# Patient Record
Sex: MALE | Race: WHITE | NOT HISPANIC OR LATINO
[De-identification: names, ages, dates, MRNs, and addresses within clinical notes are randomized per-mention and may not be internally consistent; named-entity substitution may affect disease eponyms.]

---

## 2021-10-15 PROBLEM — Z00.00 ENCOUNTER FOR PREVENTIVE HEALTH EXAMINATION: Status: ACTIVE | Noted: 2021-10-15

## 2021-10-28 ENCOUNTER — APPOINTMENT (OUTPATIENT)
Dept: PULMONOLOGY | Facility: CLINIC | Age: 41
End: 2021-10-28
Payer: MEDICAID

## 2021-10-28 ENCOUNTER — NON-APPOINTMENT (OUTPATIENT)
Age: 41
End: 2021-10-28

## 2021-10-28 VITALS
WEIGHT: 315 LBS | HEIGHT: 73 IN | DIASTOLIC BLOOD PRESSURE: 90 MMHG | SYSTOLIC BLOOD PRESSURE: 126 MMHG | HEART RATE: 81 BPM | TEMPERATURE: 98.6 F | BODY MASS INDEX: 41.75 KG/M2 | OXYGEN SATURATION: 97 %

## 2021-10-28 DIAGNOSIS — Z87.891 PERSONAL HISTORY OF NICOTINE DEPENDENCE: ICD-10-CM

## 2021-10-28 LAB — EPWORTH SCORE: 4

## 2021-10-28 PROCEDURE — 99215 OFFICE O/P EST HI 40 MIN: CPT

## 2021-10-28 NOTE — REVIEW OF SYSTEMS
[Negative] : Endocrine [Fatigue] : fatigue [EDS] : eds [Dry Mouth] : dry mouth [Sinus Problems] : sinus problems [Cough] : cough [Sputum] : sputum [A.M. Dry Mouth] : a.m. dry mouth [SOB on Exertion] : sob on exertion [Leg Cramps] : leg cramps [Depression] : depression [Anxiety] : anxiety [Obesity] : obesity [TextBox_30] : yellow

## 2021-10-28 NOTE — CONSULT LETTER
[FreeTextEntry1] : Thank you for allowing me to consult on CADY WILKERSON  for   ANITA.  Please see my note below.\par \par   [FreeTextEntry3] : Thank you very much for allowing me to consult on your patient.  If you have any questions, please do not hesitate to contact me.\par \par Sincerely,\par \par Blaine Garcia MD\par Pulmonary and Sleep Medicine\par [facility] [___] : [unfilled]

## 2021-10-28 NOTE — PHYSICAL EXAM
[No Acute Distress] : no acute distress [Normal Appearance] : normal appearance [No Neck Mass] : no neck mass [Normal Rate/Rhythm] : normal rate/rhythm [Normal S1, S2] : normal s1, s2 [No Murmurs] : no murmurs [No Resp Distress] : no resp distress [Clear to Auscultation Bilaterally] : clear to auscultation bilaterally [No Abnormalities] : no abnormalities [Benign] : benign [Normal Gait] : normal gait [No Clubbing] : no clubbing [No Cyanosis] : no cyanosis [FROM] : FROM [Normal Color/ Pigmentation] : normal color/ pigmentation [No Focal Deficits] : no focal deficits [Oriented x3] : oriented x3 [Normal Affect] : normal affect [Erythema] : erythema [Turbinate hypertrophy] : turbinate hypertrophy [IV] : Mallampati Class: IV [TextBox_11] : severe nasal congestion, erythematous o/p, glossomegaly [TextBox_89] : obese  [TextBox_105] : trace ankle edema, varicose veins of the legs

## 2021-10-28 NOTE — HISTORY OF PRESENT ILLNESS
[Awakes Unrefreshed] : awakes unrefreshed [Awakes with Dry Mouth] : awakes with dry mouth [Daytime Somnolence] : daytime somnolence [Difficulty Maintaining Sleep] : difficulty maintaining sleep [Fatigue] : fatigue [Frequent Nocturnal Awakening] : frequent nocturnal awakening [Hypersomnolence] : hypersomnolence [Nonrestorative Sleep] : nonrestorative sleep [Snoring] : snoring [Tired while Driving] : tired while driving [Unintentional Sleep while Inactive] : unintentional sleep while inactive [Unusual Movements] : unusual movements [Unusual Sleep Behavior] : unusual sleep behavior [Vivid dreams] : vivid dreams [Witnessed Apneas] : witnessed apneas [Irresistible urge to move legs] : irresistible urge to move legs [Former] : former [TextBox_4] : I was asked to consult on this 40 y/o M for a sleep evaluation. \par The patient presents w/ wife, is a former smoker w/ a h/o COVID-19 infection and ANITA. He tried using CPAP in the past, but he did not tolerate it well. He becomes anxious and tight when he lies down at night and struggles to fall asleep. He wakes up w/ nocturia nearly every hour, and he cannot remember the last time he fully slept through the night. In fact, he "doesn't know what it means" to sleep more than 1.5 hours at a time. He is generally fine during the day and can function, but suffers from occasional daytime sleepiness, including during long car trips. He has noticed a decline in his ability to recall words or names. He also c/o cough w/ sputum, sinus issues, and BARBOZA. He is currently on a Medrol Dosepak. He wears compression socks otherwise he gets swelling in his ankles. He gained 20 lbs during the pandemic, and has lost 13 since. \par \par  [Awakes with Headache] : does not awaken with headache [Cataplexy] : denies cataplexy [Difficulty Initiating Sleep] : does not have difficulty initiating sleep [Dysesthesias] : denies dysesthesias [Hypnagogic Hallucinations] : denies hypnagogic hallucinations [Hypnopompic Hallucinations] : denies hypnopompic hallucinations [Sleep Paralysis] : no history of sleep paralysis [Unintentional Sleep while Active] : no unintentional sleep while active [TextBox_77] : 12 [TextBox_79] : 8 [TextBox_81] : 20 sec [TextBox_83] : yes [TextBox_85] : 6 [TextBox_87] : 1 hour  [TextBox_89] : 5-6 [ESS] : 4 [TextBox_5] : 2 [TextBox_7] : 42 [TextBox_9] : 11

## 2021-10-28 NOTE — ASSESSMENT
[FreeTextEntry1] : Above was discussed at length with the patient and his wife, who have an excellent understanding of the issues.

## 2021-12-09 ENCOUNTER — APPOINTMENT (OUTPATIENT)
Dept: PULMONOLOGY | Facility: CLINIC | Age: 41
End: 2021-12-09
Payer: MEDICAID

## 2021-12-09 VITALS
HEART RATE: 78 BPM | TEMPERATURE: 98.6 F | WEIGHT: 315 LBS | SYSTOLIC BLOOD PRESSURE: 122 MMHG | BODY MASS INDEX: 41.75 KG/M2 | HEIGHT: 73 IN | OXYGEN SATURATION: 95 % | DIASTOLIC BLOOD PRESSURE: 80 MMHG

## 2021-12-09 DIAGNOSIS — R35.1 NOCTURIA: ICD-10-CM

## 2021-12-09 DIAGNOSIS — J32.1 CHRONIC FRONTAL SINUSITIS: ICD-10-CM

## 2021-12-09 DIAGNOSIS — G47.30 SLEEP APNEA, UNSPECIFIED: ICD-10-CM

## 2021-12-09 DIAGNOSIS — U07.1 COVID-19: ICD-10-CM

## 2021-12-09 DIAGNOSIS — I83.93 ASYMPTOMATIC VARICOSE VEINS OF BILATERAL LOWER EXTREMITIES: ICD-10-CM

## 2021-12-09 PROCEDURE — 99213 OFFICE O/P EST LOW 20 MIN: CPT

## 2021-12-09 NOTE — PHYSICAL EXAM
[No Acute Distress] : no acute distress [Erythema] : erythema [Turbinate hypertrophy] : turbinate hypertrophy [IV] : Mallampati Class: IV [Normal Appearance] : normal appearance [No Neck Mass] : no neck mass [Normal Rate/Rhythm] : normal rate/rhythm [Normal S1, S2] : normal s1, s2 [No Murmurs] : no murmurs [No Resp Distress] : no resp distress [Clear to Auscultation Bilaterally] : clear to auscultation bilaterally [No Abnormalities] : no abnormalities [Benign] : benign [Normal Gait] : normal gait [No Clubbing] : no clubbing [No Cyanosis] : no cyanosis [FROM] : FROM [Normal Color/ Pigmentation] : normal color/ pigmentation [No Focal Deficits] : no focal deficits [Oriented x3] : oriented x3 [Normal Affect] : normal affect [TextBox_11] : severe nasal congestion, erythematous o/p, glossomegaly [TextBox_89] : obese  [TextBox_105] : trace ankle edema, varicose veins of the legs

## 2021-12-09 NOTE — HISTORY OF PRESENT ILLNESS
[Former] : former [Awakes Unrefreshed] : awakes unrefreshed [Awakes with Dry Mouth] : awakes with dry mouth [Daytime Somnolence] : daytime somnolence [Difficulty Maintaining Sleep] : difficulty maintaining sleep [Fatigue] : fatigue [Frequent Nocturnal Awakening] : frequent nocturnal awakening [Hypersomnolence] : hypersomnolence [Nonrestorative Sleep] : nonrestorative sleep [Snoring] : snoring [Tired while Driving] : tired while driving [Unintentional Sleep while Inactive] : unintentional sleep while inactive [Unusual Movements] : unusual movements [Unusual Sleep Behavior] : unusual sleep behavior [Vivid dreams] : vivid dreams [Witnessed Apneas] : witnessed apneas [Irresistible urge to move legs] : irresistible urge to move legs [TextBox_4] : Patient returns on a f/u for ANITA. He is hesitant to start using CPAP. He started using nasal saline and he can breathe through his nose a little better now than before. He has lost 10 lbs since his last visit in late October and will continue to keep trying via diet and exercise.   [Awakes with Headache] : does not awaken with headache [Cataplexy] : denies cataplexy [Difficulty Initiating Sleep] : does not have difficulty initiating sleep [Dysesthesias] : denies dysesthesias [Hypnagogic Hallucinations] : denies hypnagogic hallucinations [Hypnopompic Hallucinations] : denies hypnopompic hallucinations [Sleep Paralysis] : no history of sleep paralysis [Unintentional Sleep while Active] : no unintentional sleep while active [TextBox_77] : 12 [TextBox_79] : 8 [TextBox_81] : 20 sec [TextBox_83] : yes [TextBox_85] : 6 [TextBox_87] : 1 hour  [TextBox_89] : 5-6 [ESS] : 4 [TextBox_5] : 2 [TextBox_7] : 42 [TextBox_9] : 11

## 2022-02-10 ENCOUNTER — APPOINTMENT (OUTPATIENT)
Dept: PULMONOLOGY | Facility: CLINIC | Age: 42
End: 2022-02-10